# Patient Record
Sex: FEMALE | Race: BLACK OR AFRICAN AMERICAN | NOT HISPANIC OR LATINO | URBAN - METROPOLITAN AREA
[De-identification: names, ages, dates, MRNs, and addresses within clinical notes are randomized per-mention and may not be internally consistent; named-entity substitution may affect disease eponyms.]

---

## 2023-07-11 ENCOUNTER — WEB ENCOUNTER (OUTPATIENT)
Dept: URBAN - METROPOLITAN AREA CLINIC 74 | Facility: CLINIC | Age: 63
End: 2023-07-11

## 2023-07-11 ENCOUNTER — LAB OUTSIDE AN ENCOUNTER (OUTPATIENT)
Dept: URBAN - METROPOLITAN AREA CLINIC 74 | Facility: CLINIC | Age: 63
End: 2023-07-11

## 2023-07-11 ENCOUNTER — DASHBOARD ENCOUNTERS (OUTPATIENT)
Age: 63
End: 2023-07-11

## 2023-07-11 ENCOUNTER — OFFICE VISIT (OUTPATIENT)
Dept: URBAN - METROPOLITAN AREA CLINIC 74 | Facility: CLINIC | Age: 63
End: 2023-07-11
Payer: COMMERCIAL

## 2023-07-11 VITALS
SYSTOLIC BLOOD PRESSURE: 142 MMHG | HEIGHT: 60 IN | BODY MASS INDEX: 36.44 KG/M2 | TEMPERATURE: 97.6 F | HEART RATE: 71 BPM | DIASTOLIC BLOOD PRESSURE: 92 MMHG | WEIGHT: 185.6 LBS | OXYGEN SATURATION: 98 %

## 2023-07-11 DIAGNOSIS — Z86.711 PERSONAL HISTORY OF PULMONARY EMBOLISM: ICD-10-CM

## 2023-07-11 DIAGNOSIS — Z12.11 SCREENING FOR COLON CANCER: ICD-10-CM

## 2023-07-11 PROBLEM — 161512007: Status: ACTIVE | Noted: 2023-07-11

## 2023-07-11 PROCEDURE — 99202 OFFICE O/P NEW SF 15 MIN: CPT | Performed by: INTERNAL MEDICINE

## 2023-07-11 RX ORDER — TELMISARTAN 20 MG/1
2 TABLETS TABLET ORAL ONCE A DAY
Qty: 60 | Status: ACTIVE | COMMUNITY
Start: 2023-07-11

## 2023-07-11 RX ORDER — RIVAROXABAN 20 MG/1
1 TABLET WITH FOOD TABLET, FILM COATED ORAL ONCE A DAY
Status: ACTIVE | COMMUNITY

## 2023-07-11 RX ORDER — SODIUM CHLORIDE 0.65 %
2 DROPS IN EACH NOSTRIL AS NEEDED DROPS NASAL
Status: DISCONTINUED | COMMUNITY

## 2023-07-11 RX ORDER — LEVOTHYROXINE SODIUM 75 UG/1
1 TABLET IN THE MORNING ON AN EMPTY STOMACH TABLET ORAL ONCE A DAY
Qty: 30 | Status: ACTIVE | COMMUNITY
Start: 2023-07-11

## 2023-07-11 RX ORDER — PHENTERMINE HYDROCHLORIDE 37.5 MG/1
1 CAPSULE CAPSULE ORAL ONCE A DAY
Status: DISCONTINUED | COMMUNITY

## 2023-07-11 RX ORDER — ASPIRIN 81 MG/1
1 TABLET TABLET, COATED ORAL ONCE A DAY
Status: DISCONTINUED | COMMUNITY

## 2023-07-11 RX ORDER — GREEN TEA/HOODIA GORDONII 315-12.5MG
1 CAPSULE CAPSULE ORAL ONCE A DAY
Status: DISCONTINUED | COMMUNITY

## 2023-07-11 NOTE — HPI-TODAY'S VISIT:
61 yo AAF here for screening colonoscopy. last colonoscopy 10 years ago-no polyps. NO GI issues at this time. on xarelto for h/o PE many years ago. no recent cardiac/respiratory issues

## 2023-08-11 ENCOUNTER — OFFICE VISIT (OUTPATIENT)
Dept: URBAN - METROPOLITAN AREA SURGERY CENTER 30 | Facility: SURGERY CENTER | Age: 63
End: 2023-08-11
Payer: COMMERCIAL

## 2023-08-11 ENCOUNTER — CLAIMS CREATED FROM THE CLAIM WINDOW (OUTPATIENT)
Dept: URBAN - METROPOLITAN AREA CLINIC 4 | Facility: CLINIC | Age: 63
End: 2023-08-11
Payer: COMMERCIAL

## 2023-08-11 DIAGNOSIS — K63.89 OTHER SPECIFIED DISEASES OF INTESTINE: ICD-10-CM

## 2023-08-11 DIAGNOSIS — Z12.11 COLON CANCER SCREENING: ICD-10-CM

## 2023-08-11 PROCEDURE — 88305 TISSUE EXAM BY PATHOLOGIST: CPT | Performed by: PATHOLOGY

## 2023-08-11 PROCEDURE — G8907 PT DOC NO EVENTS ON DISCHARG: HCPCS | Performed by: INTERNAL MEDICINE

## 2023-08-11 PROCEDURE — 45380 COLONOSCOPY AND BIOPSY: CPT | Performed by: INTERNAL MEDICINE
